# Patient Record
Sex: FEMALE | Race: OTHER
[De-identification: names, ages, dates, MRNs, and addresses within clinical notes are randomized per-mention and may not be internally consistent; named-entity substitution may affect disease eponyms.]

---

## 2020-01-29 ENCOUNTER — HOSPITAL ENCOUNTER (EMERGENCY)
Dept: HOSPITAL 74 - JERFT | Age: 20
Discharge: HOME | End: 2020-01-29
Payer: COMMERCIAL

## 2020-01-29 VITALS — DIASTOLIC BLOOD PRESSURE: 88 MMHG | SYSTOLIC BLOOD PRESSURE: 138 MMHG | TEMPERATURE: 99.6 F | HEART RATE: 120 BPM

## 2020-01-29 VITALS — BODY MASS INDEX: 35.2 KG/M2

## 2020-01-29 DIAGNOSIS — J06.9: ICD-10-CM

## 2020-01-29 DIAGNOSIS — H66.92: Primary | ICD-10-CM

## 2020-01-29 NOTE — PDOC
History of Present Illness





- General


Chief Complaint: Cold Symptoms


Stated Complaint: NAUSEA/VOMITING


Time Seen by Provider: 01/29/20 11:12





- History of Present Illness


Initial Comments: 





01/29/20 11:44


19-year-old female without comorbidities presents for evaluation of URI 

symptoms left ear pain and cough





Past History





- Past Medical History


Home Medications: 


Ambulatory Orders





Amoxicillin 875 mg PO BID #20 tablet 01/29/20 


Guaifenesin Dm [Mucinex Dm -] 1 tab PO BID #60 tab.er.12h 01/29/20 











- Psycho Social/Smoking Cessation Hx


Smoking History: Never smoked


Hx Alcohol Use: No


Drug/Substance Use Hx: No





**Review of Systems





- Review of Systems


Constitutional: No: Fever


HEENTM: Yes: Ear Pain


Respiratory: Yes: Cough





*Physical Exam





- Vital Signs


 Last Vital Signs











Temp Pulse Resp BP Pulse Ox


 


 99.6 F   120 H  18   138/88   98 


 


 01/29/20 10:56  01/29/20 10:56  01/29/20 10:56  01/29/20 10:56  01/29/20 10:56














- Physical Exam





01/29/20 11:45


GENERAL: The patient is awake, alert, and fully oriented, in no acute distress.


HEAD: Normal with no signs of trauma.


EYES:  sclera anicteric, conjunctiva clear.


ENT: Left ear canal is erythemic tympanic membrane is erythemic and retracted 

right ear canal and tympanic membrane are normal oropharynx clear uvula midline


NECK: Normal range of motion


LUNGS: Breath sounds equal, clear to auscultation bilaterally.  No wheezes, and 

no crackles.


HEART: S1 and S2 without murmur, rub or gallop.


ABDOMEN: Soft, nontender, normoactive bowel sounds.  No guarding, no rebound.  

No masses.


EXTREMITIES: Normal range of motion, no edema.  No clubbing or cyanosis. No 

cords, erythema, or tenderness.


NEUROLOGICAL: Cranial nerves II through XII grossly intact.


PSYCH: Normal mood, normal affect.


SKIN: Warm, Dry, normal turgor, no rashes or lesions noted.





Medical Decision Making





- Medical Decision Making





01/29/20 11:45


We will treat for otitis media





Discharge





- Discharge Information


Problems reviewed: Yes


Clinical Impression/Diagnosis: 


 Viral URI with cough, Otitis media





Condition: Stable


Disposition: HOME





- Admission


No





- Additional Discharge Information


Prescriptions: 


Amoxicillin 875 mg PO BID #20 tablet


Guaifenesin Dm [Mucinex Dm -] 1 tab PO BID #60 tab.er.12h





- Follow up/Referral


Referrals: 


Juan Jose Noriega MD [Staff Physician] - 





- Patient Discharge Instructions


Patient Printed Discharge Instructions:  DI for Viral Upper Respiratory 

Infection -- Adult, Middle Ear Infection


Additional Instructions: 


Please take the amoxicillin as directed.  Tylenol Motrin for pain as directed.  

Please take the Mucinex for cough.  Return to the emergency room for worsening 

symptoms and without fail follow-up with your primary care physician in 1 to 2 

days for further evaluation and treatment options.





- Post Discharge Activity

## 2020-07-06 ENCOUNTER — HOSPITAL ENCOUNTER (EMERGENCY)
Dept: HOSPITAL 74 - JER | Age: 20
Discharge: HOME | End: 2020-07-06
Payer: COMMERCIAL

## 2020-07-06 VITALS — BODY MASS INDEX: 36.3 KG/M2

## 2020-07-06 VITALS — TEMPERATURE: 98 F

## 2020-07-06 VITALS — SYSTOLIC BLOOD PRESSURE: 132 MMHG | DIASTOLIC BLOOD PRESSURE: 74 MMHG | HEART RATE: 64 BPM

## 2020-07-06 DIAGNOSIS — R42: Primary | ICD-10-CM

## 2020-07-06 LAB
ALBUMIN SERPL-MCNC: 4.3 G/DL (ref 3.4–5)
ALP SERPL-CCNC: 110 U/L (ref 45–117)
ALT SERPL-CCNC: 58 U/L (ref 13–61)
ANION GAP SERPL CALC-SCNC: 11 MMOL/L (ref 8–16)
APPEARANCE UR: CLEAR
AST SERPL-CCNC: 44 U/L (ref 15–37)
BILIRUB SERPL-MCNC: 0.8 MG/DL (ref 0.2–1)
BILIRUB UR STRIP.AUTO-MCNC: NEGATIVE MG/DL
BUN SERPL-MCNC: 14.8 MG/DL (ref 7–18)
CALCIUM SERPL-MCNC: 9.5 MG/DL (ref 8.5–10.1)
CHLORIDE SERPL-SCNC: 105 MMOL/L (ref 98–107)
CO2 SERPL-SCNC: 23 MMOL/L (ref 21–32)
COLOR UR: YELLOW
CREAT SERPL-MCNC: 0.8 MG/DL (ref 0.55–1.3)
DEPRECATED RDW RBC AUTO: 13.2 % (ref 11.6–15.6)
EOSINOPHIL # BLD: 4 % (ref 0–4.5)
GLUCOSE SERPL-MCNC: 110 MG/DL (ref 74–106)
HCT VFR BLD CALC: 43.9 % (ref 32.4–45.2)
HGB BLD-MCNC: 14.5 GM/DL (ref 10.7–15.3)
KETONES UR QL STRIP: NEGATIVE
LEUKOCYTE ESTERASE UR QL STRIP.AUTO: NEGATIVE
LYMPHOCYTES # BLD: 1 % (ref 8–40)
MCH RBC QN AUTO: 29.8 PG (ref 25.7–33.7)
MCHC RBC AUTO-ENTMCNC: 33 G/DL (ref 32–36)
MCV RBC: 90.3 FL (ref 80–96)
MONOCYTES # BLD AUTO: 20 % (ref 3.8–10.2)
NEUTROPHILS # BLD: 75 % (ref 42.8–82.8)
NITRITE UR QL STRIP: NEGATIVE
PH UR: 5.5 [PH] (ref 5–8)
PLATELET # BLD AUTO: 347 K/MM3 (ref 134–434)
PLATELET BLD QL SMEAR: ADEQUATE
PMV BLD: 8.6 FL (ref 7.5–11.1)
POTASSIUM SERPLBLD-SCNC: 4.3 MMOL/L (ref 3.5–5.1)
PROT SERPL-MCNC: 7.9 G/DL (ref 6.4–8.2)
PROT UR QL STRIP: NEGATIVE
PROT UR QL STRIP: NEGATIVE
RBC # BLD AUTO: 4.86 M/MM3 (ref 3.6–5.2)
SODIUM SERPL-SCNC: 139 MMOL/L (ref 136–145)
SP GR UR: 1.02 (ref 1.01–1.03)
UROBILINOGEN UR STRIP-MCNC: 0.2 MG/DL (ref 0.2–1)
WBC # BLD AUTO: 16.6 K/MM3 (ref 4–10)

## 2020-07-06 NOTE — PDOC
History of Present Illness





- General


Chief Complaint: Weakness


Stated Complaint: DIZZINESS


Time Seen by Provider: 07/06/20 17:35


History Source: Patient


Exam Limitations: No Limitations





- History of Present Illness


Initial Comments: 





07/06/20 18:16


20 Year old female with no pmhx presenting to the ED complaining of 1 year of 

intermittent episodes of migraine HA associated with dizziness, nausea and 

vomiting, without aura.  Pt states that today after eating she developed a HA b

ecame hot and nauseous and vomited once, NBNB.  Pt states most of her symptoms 

resolved prior to her arrival in the ED however she states she is still dizzy, 

worse with head movement and standing described as room spinning lasting a few 

seconds.  Pt states she never sought medical attention for these symptoms 

because they were sporadic and came on randomly and resided on their own.  Pt 

otherwise denies: fevers, chills, syncope, lightheadedness, changes in vision, 

changes in hearing, tinnitis, neck pain, chest pain, shortness of breath, 

palpitations, back pain, abdominal pain, diarrhea, constipation, melena, 

hematochezia, dysuria, hematuria. 


07/06/20 21:49








Past History





- Medical History


Allergies/Adverse Reactions: 


                                    Allergies











Allergy/AdvReac Type Severity Reaction Status Date / Time


 


No Known Allergies Allergy   Verified 07/06/20 17:35











Home Medications: 


Ambulatory Orders





Meclizine HCl [Dramamine Less Drowsy] 25 mg PO BID PRN 10 Days #20 tablet 

07/06/20 








COPD: No


Other medical history: DENIES





- Immunization History


Immunization Up to Date: No





- Psycho-Social/Smoking History


Smoking History: Never smoked





- Substance Abuse Hx (Audit-C & DAST Scrn)


How often the patient has a drink containing alcohol: Never


Score: In Men: 4 or > Positive; In Women: 3 or > Positive: 0


Screen Result (Pos requires Nsg. Audit-10AR): Negative


In the last yr the pt used illegal drug/Rx for NonMed reason: No


Score:  Yes response is considered Positive: 0


Screen Result (Positive result requires Nsg. DAST-10): Negative





**Review of Systems





- Review of Systems


Constitutional: No: Chills, Diaphoresis, Fever, Weakness


HEENTM: No: Blurred Vision, Ear Pain, Tinnitus, Hearing Loss


Respiratory: No: Shortness of Breath


Cardiac (ROS): No: Chest Pain


ABD/GI: Yes: Nausea, Vomiting.  No: Abdominal Distended, Diarrhea, Abdominal 

cramping


: No: Burning, Dysuria


Neurological: Yes: Dizziness.  No: Headache, Numbness, Paresthesia, Tingling





*Physical Exam





- Vital Signs


                                Last Vital Signs











Temp Pulse Resp BP Pulse Ox


 


 98.0 F   78   19   148/95   100 


 


 07/06/20 17:35  07/06/20 17:35  07/06/20 17:35  07/06/20 17:35  07/06/20 17:35














- Physical Exam





Gen: AAOx 3, no acute distress, comfortable, no signs of respiratory distress 


HENT: atraumatic, normocephalic with no laceration or contusion. Nasal mucosa 

without erythema. Oropharynx without erythema or exudates. Mucous membranes 

moist. 


EYES: PERRL, EOM intact, conjunctiva pink 


NECK: supple; trachea midline; no JVD, no lymphadenopathy, or thyromegaly


CV: RRR no murmurs, gallops, or rubs.


CHEST: CTA b/l no wheezing, rales or rhonchi


ABD: +BS/ND. no TTP; soft, no rebound, no guarding


EXTREMITY: no cyanosis or erythema. 2+ dorsalis pedis, posterior tibial, and 

radial pulse. No pedal edema; no calf swelling or tenderness 


SKIN: no rash, warm and dry, no diaphoresis 


HEME: no purpura or ecchymosis


NEURO: normal speech, CN II-XII intact, sensation intact, normal gait, no 

cerebellar deficits


MS: 5/5 strength in all extremities, FROM intact in all extremities.








ED Treatment Course





- LABORATORY


CBC & Chemistry Diagram: 


                                 07/06/20 18:30





                                 07/06/20 18:30





Medical Decision Making





- Medical Decision Making





07/06/20 18:22


20 year old female complaining of HA N/V which resolved prior to arrival.  

Currently complaining of dizziness vertigo like in nature. 


Benign neurological exam


VSS





Will obtain CBC, CMP and TSH, EKG and upreg to assess for any abnormalities and 

pregnancy. Will reassess based on results.  





Pt requesting referral to PCP, referral given in discharge papers. 





Labs show WBC 16.6 most likely reactive to vomiting as there are no focal neuro 

deficits, abdominal pain or urinary symptoms, AST mildly elevated, UA and Upreg 

negative. EKG NSR at 73BPM 


Pt appears well, feels better with meds. 


Pt is safe and stable for discharge. I explained importance to patient to follow

up ASAP.





Supportive care instructions explained and given to pt.  Reasons to return 

emergently to ER explained and given. Importance of follow up with PMD and other

specialists as indicated stressed to pt. Pt verbalized understanding of 

instructions.  Pt to follow up with PMD in 2 days.























Discharge





- Discharge Information


Problems reviewed: Yes


Clinical Impression/Diagnosis: 


 Dizziness





Condition: Stable


Disposition: HOME





- Additional Discharge Information


Prescriptions: 


Meclizine HCl [Dramamine Less Drowsy] 25 mg PO BID PRN 10 Days #20 tablet


 PRN Reason: Vertigo





- Follow up/Referral


Referrals: 


Henrik Fitzpatrick MD [Staff Physician] - 


Luly Ghosh MD [Non Staff, Medical] - 





- Patient Discharge Instructions


Patient Printed Discharge Instructions:  DI for Vertigo


Additional Instructions: 


YOU MUST FOLLOW UP WITH A PRIMARY CARE DOCTOR ASAP


BRING RESULTS FROM ED WITH YOU





- Post Discharge Activity


Work/Back to School Note:  Back to Work

## 2020-07-06 NOTE — PDOC
Rapid Medical Evaluation


Time Seen by Provider: 07/06/20 17:35


Medical Evaluation: 


                                    Allergies











Allergy/AdvReac Type Severity Reaction Status Date / Time


 


No Known Allergies Allergy   Verified 07/06/20 17:35











07/06/20 17:36


I have performed a brief in-person evaluation of this patient.





The patient presents with a chief complaint of: After eating this afternoon, 

developed sudden onset HA, dizziness, SOB and generalized weakness, since 

improved. No CP, palpitations, f/c. Has had this episode several times in the 

past but has never seeked medical eval but states sxs today was worse. Denies 

anxiety or drug use. 





Pertinent physical exam findings: Stable and in NAD





I have ordered the following:nothing





The patient will proceed to the ED for further evaluation.








**Discharge Disposition





- Diagnosis


 Dizziness








- Referrals





- Patient Instructions





- Post Discharge Activity

## 2020-07-07 NOTE — EKG
Test Reason : 

Blood Pressure : ***/*** mmHG

Vent. Rate : 072 BPM     Atrial Rate : 072 BPM

   P-R Int : 118 ms          QRS Dur : 074 ms

    QT Int : 398 ms       P-R-T Axes : 022 032 008 degrees

   QTc Int : 435 ms

 

NORMAL SINUS RHYTHM WITH SINUS ARRHYTHMIA

NORMAL ECG

NO PREVIOUS ECGS AVAILABLE

Confirmed by MD Roc, Jesus ([id number]) on 7/7/2020 12:04:43 PM

 

Referred By:             Confirmed By:Jesus Brian MD

## 2020-10-06 ENCOUNTER — HOSPITAL ENCOUNTER (EMERGENCY)
Dept: HOSPITAL 74 - JER | Age: 20
Discharge: HOME | End: 2020-10-06
Payer: COMMERCIAL

## 2020-10-06 VITALS — SYSTOLIC BLOOD PRESSURE: 119 MMHG | TEMPERATURE: 97.9 F | HEART RATE: 94 BPM | DIASTOLIC BLOOD PRESSURE: 78 MMHG

## 2020-10-06 VITALS — BODY MASS INDEX: 40.3 KG/M2

## 2020-10-06 DIAGNOSIS — R11.2: Primary | ICD-10-CM

## 2020-10-06 LAB
ALBUMIN SERPL-MCNC: 3.8 G/DL (ref 3.4–5)
ALP SERPL-CCNC: 113 U/L (ref 45–117)
ALT SERPL-CCNC: 57 U/L (ref 13–61)
ANION GAP SERPL CALC-SCNC: 7 MMOL/L (ref 8–16)
AST SERPL-CCNC: 72 U/L (ref 15–37)
BILIRUB SERPL-MCNC: 0.4 MG/DL (ref 0.2–1)
BUN SERPL-MCNC: 14.7 MG/DL (ref 7–18)
CALCIUM SERPL-MCNC: 8.4 MG/DL (ref 8.5–10.1)
CHLORIDE SERPL-SCNC: 106 MMOL/L (ref 98–107)
CO2 SERPL-SCNC: 25 MMOL/L (ref 21–32)
CREAT SERPL-MCNC: 0.9 MG/DL (ref 0.55–1.3)
GLUCOSE SERPL-MCNC: 87 MG/DL (ref 74–106)
POTASSIUM SERPLBLD-SCNC: 5 MMOL/L (ref 3.5–5.1)
PROT SERPL-MCNC: 7.4 G/DL (ref 6.4–8.2)
SODIUM SERPL-SCNC: 138 MMOL/L (ref 136–145)

## 2020-10-06 NOTE — EKG
Test Reason : 

Blood Pressure : ***/*** mmHG

Vent. Rate : 089 BPM     Atrial Rate : 089 BPM

   P-R Int : 136 ms          QRS Dur : 074 ms

    QT Int : 372 ms       P-R-T Axes : 043 038 016 degrees

   QTc Int : 452 ms

 

NORMAL SINUS RHYTHM WITH SINUS ARRHYTHMIA

NORMAL ECG

WHEN COMPARED WITH ECG OF 06-JUL-2020 20:23,

NO SIGNIFICANT CHANGE WAS FOUND

Confirmed by Pérez Mack MD ([id number]) on 10/6/2020 1:38:54 PM

 

Referred By:             Confirmed By:Pérez Mack MD

## 2020-10-06 NOTE — PDOC
Attending Attestation





- Resident


Resident Name: Natalee Ochoa





- ED Attending Attestation


I have performed the following: I have examined & evaluated the patient, The 

case was reviewed & discussed with the resident, I agree w/resident's findings &

plan, Exceptions are as noted





- HPI


HPI: 





19 yo F 


presents with N/V x2 this morning, drank EtOH last night. She states that she 

became lightheaded afterwards, lost consciousness in the bathroom. She has had 

headaches with lightheadedness in the past, but has not passed out before. 

Denies SOB, cp, leg swelling, fever, cough, diarrhea. 








- Physicial Exam


PE: 





GENERAL: Awake, alert, and fully oriented, in no acute distress


HEAD: No signs of trauma


EYES: PERRLA, EOMI, sclera anicteric, conjunctiva clear


ENT: Auricles normal inspection, hearing grossly normal, nares patent, 

oropharynx clear without exudates. Dry mucosa


NECK: Normal ROM, supple, no lymphadenopathy, JVD, or masses


LUNGS: Breath sounds equal, clear to auscultation bilaterally. No wheezes, and 

no crackles


HEART: Regular rate and rhythm, normal S1 and S2, no murmurs, rubs or gallops


ABDOMEN: Soft, nontender, normoactive bowel sounds. No guarding, no rebound. No 

masses


EXTREMITIES: Normal range of motion, no edema. No clubbing or cyanosis. No 

cords, erythema, or tenderness


NEUROLOGICAL: Cranial nerves II through XII grossly intact. Normal speech, 

normal gait. Motor and sensation intact


SKIN: Warm, dry, normal turgor, no rashes or lesions noted. 








- Medical Decision Making





10/06/20 13:00


Pt with nausea, vomiting after drinking heavily last night. Appears dehydrated 

on exam. Will check labs, give IV fluids, reassess. Likely DC home. 











**Heart Score/ECG Review





- ECG Impressions


Comment:: 





EKG read 13:10- NSR 89 bpm, no acute ST/T changes








Discharge





- Discharge Information


Problems reviewed: Yes


Clinical Impression/Diagnosis: 


Nausea & vomiting


Qualifiers:


 Vomiting type: unspecified Vomiting Intractability: non-intractable Qualified 

Code(s): R11.2 - Nausea with vomiting, unspecified





Condition: Good


Disposition: HOME





- Follow up/Referral


Referrals: 


Sid Garcia MD [Primary Care Provider] - 





- Patient Discharge Instructions


Patient Printed Discharge Instructions:  DI for Nausea -- Adult, DI for Vomiting

-- Adult


Additional Instructions: 


You came to the ED because you had episodes of nausea, vomiting and passing out 

this morning


We did an EKG and labwork which was normal


We gave you tylenol, pepcid and fluids


You reported that you were feeling better





You can continue to take tylenol as needed pain. Follow the instructions on the 

bottle. 





Please return to the ED with any new or concerning symptoms. Return if you have 

vomiting that wont stop or has blood in it, if you have chest pain or 

palpitations that wont go away, or pass out again. 





- Post Discharge Activity

## 2020-10-06 NOTE — PDOC
History of Present Illness





- General


Chief Complaint: Nausea/Vomiting


Stated Complaint: NAUSEA VOMITING


Time Seen by Provider: 10/06/20 12:04


History Source: Patient


Exam Limitations: No Limitations





- History of Present Illness


Initial Comments: 





10/06/20 12:06


HPI:


This is a 19 y/o female with no PMH BIBA due to x2 episodes of vomiting this 

morning, palpitations, and a syncopal episode. The patient states she woke up 

today due to nausea and proceeded to have an episode of NBNB emesis followed by 

heart palpitations and lightheadedness. Shortly after, she had another episode 

of vomiting. Her friend then tried to walk with her to bed, and while she was 

standing behind her helping support her she folded to the ground. Per the 

friend, she was tremulous and had LOC at the time. She then had an episode of 

diarrhea while she was on the ground. At this point they called EMS, and friend 

states the patient was answering questions. The patient does note that she was 

drinking last night, and had about 10 cups of a tequila/juice mixture but 

"wasn't drunk." She denies any current chest pain, SOB, abdominal pain, nausea 

or lightheadedness. She says she only feels "weak."





ROS:


GENERAL/CONSTITUTIONAL: No fever/chills, diaphoresis. Yes weakness.


HEENT: No change in vision. No blurry vision


CARDIOVASCULAR: Yes palpitations now resolved. No chest pain or peripheral edema


RESPIRATORY: Yes SOB now resolved., No dyspnea with exertion, cough


GASTROINTESTINAL: No abdominal pain, Yes nausea and 2 episodes nbnb vomiting. 

Yes diarrhea


GENITOURINARY: No dysuria, frequency


MUSCULOSKELETAL: No joint or muscle swelling or pain. 


NEUROLOGIC: No headache, vertigo, focal weakness, Uncertain LOC








PMH: Denied


PSx: Denied


Social Hx: Admits to etoh 1-2x a month, Denied tobacco, drug use


Meds: See nurse note


Allergies: See nurse note





PE:


GENERAL: Awake, alert, and fully oriented, in no acute distress. Patient is 

appropriately conversational. Non-toxic appearing


HEENT: Normocephalic, atraumatic. PERRLA, EOMI. Dry mucus membranes.


NECK: Normal ROM and supple. No JVD, or masses.


CARDIOVASCULAR: Regular rate and rhythm, normal S1 and S2


PULMONARY: No respiratory distress. Breath sounds equal, clear bilaterally


ABDOMEN: Soft, nontender


EXTREMITIES: Normal range of motion, no edema or erythema, no calf tenderness.  


NEUROLOGICAL: Cranial nerves II through XII grossly intact.  Normal speech.


SKIN: Dry, normal turgor





MDM:


10/06/20 13:09


This is a 19 y/o female with no PMH BIBA due to x2 episodes of vomiting this 

morning, palpitations, and a syncopal episode.


- Patient's vitals stable in ED, well appearing


- No chest pain, palpitations


- AAOx3, no focal neurologic deficits


- States she drank 10 glasses of etoh last night


- Given palpitations will do EKG, CBC, CMP





- EKG no st elevations or T wave inversions


- Sinus rhythm Vent rate 89bpm


- Largely unchanged from previous KEG





10/06/20 13:39


- Patient feeling better after Pepcid, Tylenol, 1L NS and 1L PO water.


- No N/V since being in ED


- Patient able to tolerate PO





- CMP non-concerning. AST 72. No abdominal pain.





10/06/20 13:43


- Patient feeling better, tolerating PO, no N/V. Ready to d/c once CBC comes 

back


- CBC clotted a second time. Patient feeling better, ready to go home. 





10/06/20 14:05


- Patient able to ambulate in ED


- Patient agrees about not repeating CBC, Aware of elevated AST, will follow-up 

with PCP in next few days.


- Patient stable to d/c with follow-up and return precautions. 














Past History





- Medical History


Allergies/Adverse Reactions: 


                                    Allergies











Allergy/AdvReac Type Severity Reaction Status Date / Time


 


No Known Allergies Allergy   Verified 07/06/20 17:35











Home Medications: 


Ambulatory Orders





Meclizine HCl [Dramamine Less Drowsy] 25 mg PO BID PRN 10 Days #20 tablet 

07/06/20 








COPD: No





- Reproductive History


Is Patient Pregnant Now?: No





- Immunization History


Immunization Up to Date: No





- Psycho-Social/Smoking History


Smoking History: Never smoked





- Substance Abuse Hx (Audit-C & DAST Scrn)


How often the patient has a drink containing alcohol: 2-4 times / month


How often the patient has six or more drinks on one occasion: Less than monthly


Score: In Men: 4 or > Positive; In Women: 3 or > Positive: 3


Screen Result (Pos requires Nsg. Audit-10AR): Positive


In the last yr the pt used illegal drug/Rx for NonMed reason: No


Score:  Yes response is considered Positive: 0


Screen Result (Positive result requires Nsg. DAST-10): Negative





*Physical Exam





- Vital Signs


                                Last Vital Signs











Temp Pulse Resp BP Pulse Ox


 


 97.9 F   94 H  20   119/78   100 


 


 10/06/20 11:15  10/06/20 11:15  10/06/20 11:15  10/06/20 11:15  10/06/20 11:15














ED Treatment Course





- LABORATORY


CBC & Chemistry Diagram: 


                                 10/06/20 13:35





                                 10/06/20 12:50





Discharge





- Discharge Information


Problems reviewed: Yes


Clinical Impression/Diagnosis: 


Nausea & vomiting


Qualifiers:


 Vomiting type: unspecified Vomiting Intractability: non-intractable Qualified 

Code(s): R11.2 - Nausea with vomiting, unspecified





Condition: Good


Disposition: HOME





- Admission


No





- Follow up/Referral


Referrals: 


Sid Garcia MD [Primary Care Provider] - 





- Patient Discharge Instructions


Patient Printed Discharge Instructions:  DI for Nausea -- Adult, DI for Vomiting

-- Adult


Additional Instructions: 


You came to the ED because you had episodes of nausea, vomiting and passing out 

this morning


We did an EKG and labwork which was normal


We gave you tylenol, pepcid and fluids


You reported that you were feeling better





You can continue to take tylenol as needed pain. Follow the instructions on the 

bottle. 





Please return to the ED with any new or concerning symptoms. Return if you have 

vomiting that wont stop or has blood in it, if you have chest pain or 

palpitations that wont go away, or pass out again. 





Please follow-up with your PCP in the next week. 





- Post Discharge Activity

## 2020-12-31 ENCOUNTER — HOSPITAL ENCOUNTER (EMERGENCY)
Dept: HOSPITAL 74 - JER | Age: 20
Discharge: HOME | End: 2020-12-31
Payer: COMMERCIAL

## 2020-12-31 VITALS — DIASTOLIC BLOOD PRESSURE: 75 MMHG | TEMPERATURE: 98.6 F | SYSTOLIC BLOOD PRESSURE: 120 MMHG | HEART RATE: 93 BPM

## 2020-12-31 VITALS — BODY MASS INDEX: 40.7 KG/M2

## 2020-12-31 DIAGNOSIS — R05: Primary | ICD-10-CM

## 2020-12-31 DIAGNOSIS — Z03.818: ICD-10-CM

## 2020-12-31 DIAGNOSIS — J06.9: ICD-10-CM

## 2020-12-31 PROCEDURE — C9803 HOPD COVID-19 SPEC COLLECT: HCPCS

## 2020-12-31 PROCEDURE — U0003 INFECTIOUS AGENT DETECTION BY NUCLEIC ACID (DNA OR RNA); SEVERE ACUTE RESPIRATORY SYNDROME CORONAVIRUS 2 (SARS-COV-2) (CORONAVIRUS DISEASE [COVID-19]), AMPLIFIED PROBE TECHNIQUE, MAKING USE OF HIGH THROUGHPUT TECHNOLOGIES AS DESCRIBED BY CMS-2020-01-R: HCPCS

## 2021-03-12 ENCOUNTER — HOSPITAL ENCOUNTER (EMERGENCY)
Dept: HOSPITAL 74 - JER | Age: 21
Discharge: HOME | End: 2021-03-12
Payer: COMMERCIAL

## 2021-03-12 VITALS — HEART RATE: 113 BPM | TEMPERATURE: 99 F | SYSTOLIC BLOOD PRESSURE: 107 MMHG | DIASTOLIC BLOOD PRESSURE: 62 MMHG

## 2021-03-12 VITALS — BODY MASS INDEX: 37.5 KG/M2

## 2021-03-12 DIAGNOSIS — S93.602A: Primary | ICD-10-CM
